# Patient Record
Sex: FEMALE | Race: WHITE | Employment: OTHER | URBAN - METROPOLITAN AREA
[De-identification: names, ages, dates, MRNs, and addresses within clinical notes are randomized per-mention and may not be internally consistent; named-entity substitution may affect disease eponyms.]

---

## 2018-08-21 ENCOUNTER — TRANSFERRED RECORDS (OUTPATIENT)
Dept: HEALTH INFORMATION MANAGEMENT | Facility: CLINIC | Age: 73
End: 2018-08-21

## 2018-09-17 ENCOUNTER — OFFICE VISIT (OUTPATIENT)
Dept: OPHTHALMOLOGY | Facility: CLINIC | Age: 73
End: 2018-09-17
Payer: COMMERCIAL

## 2018-09-17 DIAGNOSIS — Z98.890 HX OF LASIK: ICD-10-CM

## 2018-09-17 DIAGNOSIS — H25.813 COMBINED FORM OF AGE-RELATED CATARACT, BOTH EYES: Primary | ICD-10-CM

## 2018-09-17 PROCEDURE — 92002 INTRM OPH EXAM NEW PATIENT: CPT | Performed by: OPHTHALMOLOGY

## 2018-09-17 ASSESSMENT — VISUAL ACUITY
CORRECTION_TYPE: GLASSES
OD_CC+: -1
OS_CC: 20/30
METHOD: SNELLEN - LINEAR
OD_CC: 20/25

## 2018-09-17 ASSESSMENT — EXTERNAL EXAM - RIGHT EYE: OD_EXAM: PROLAPSED FAT PADS: UPPER, LOWER

## 2018-09-17 ASSESSMENT — REFRACTION_WEARINGRX
OS_AXIS: 021
OD_ADD: +2.50
OS_ADD: +2.50
OD_SPHERE: +2.00
OD_AXIS: 159
OS_SPHERE: +1.00
SPECS_TYPE: PAL
OD_CYLINDER: +0.50
OS_CYLINDER: +0.50

## 2018-09-17 ASSESSMENT — SLIT LAMP EXAM - LIDS
COMMENTS: 1+ DERMATOCHALASIS
COMMENTS: 1+ DERMATOCHALASIS

## 2018-09-17 ASSESSMENT — EXTERNAL EXAM - LEFT EYE: OS_EXAM: PROLAPSED FAT PADS: UPPER, LOWER

## 2018-09-17 NOTE — PROGRESS NOTES
Current Eye Medications:  None.       Subjective:  She is here for a second opinion on her cataracts.  She see's Dr. Lakhani, OD at [facility] and he recommended she have cataract surgery with [facility] two years ago.  She was evaluated for cataracts, was told that her cataracts were not ready for surgery (2 years ago).  She recently saw Dr. Webber again on 8-21-18 and he told her that her eyes were about the same.  She brought a copy of her last exam.    Overall, she feels her vision is not as good as she'd like, in both eyes.    Sister Jelly Loredo and her  are patients of Dr. Ferguson and they recommended she come here.  She declines dilation (I informed her that Dr. Ferguson would not be able to fully evaluate her cataracts without dilation), she will return for dilation when she can bring a .   Status/Post Lasik surgery in 1997 with Dr. Napier.  No family history of glaucoma.       Objective:  See Ophthalmology Exam.       Assessment:  Cataracts do not appear to be visually significant on exam today undilated.      Plan:  Return visit 6 months for a complete exam.  Sign record release form.  (Get LASIK info).  Antonio Ferguson M.D.  [phone]

## 2018-09-17 NOTE — MR AVS SNAPSHOT
After Visit Summary   9/17/2018    Diana Rahman    MRN: [id number]           Patient Information     Date Of Birth          1945        Visit Information        Provider Department      9/17/2018 9:15 AM Antonio Ferguson MD [facility]        Today's Diagnoses     S/P LASIK surgery of both eyes    -  1    Combined form of age-related cataract, both eyes          Care Instructions    Return visit 6 months for a complete exam.  Sign record release form.  Antonio Ferguson M.D.  [phone]            Follow-ups after your visit        Who to contact     If you have questions or need follow up information about today's clinic visit or your schedule please contact [facility] directly at [phone].  Normal or non-critical lab and imaging results will be communicated to you by MyChart, letter or phone within 4 business days after the clinic has received the results. If you do not hear from us within 7 days, please contact the clinic through MyChart or phone. If you have a critical or abnormal lab result, we will notify you by phone as soon as possible.  Submit refill requests through [facility] or call your pharmacy and they will forward the refill request to us. Please allow 3 business days for your refill to be completed.          Additional Information About Your Visit        Care EveryWhere ID     This is your Care EveryWhere ID. This could be used by other organizations to access your [place] medical records  [id number]         Blood Pressure from Last 3 Encounters:   No data found for BP    Weight from Last 3 Encounters:   No data found for Wt              Today, you had the following     No orders found for display       Primary Care Provider Office Phone # Fax #    Bobo Guidry [phone]       [facility] [address]  [facility] [address]        Equal Access to Services     SPENSER HAWTHORNE AH: sondra Stringer  angel paula waxkaleb bernardin hayaajayson raojustin cleaningjayson juan. So [facility] [phone].    ATENCIÓN: Si colin randall, tiene a amezquita disposición servicios gratuitos de asistencia lingüística. Llame al [phone].    We comply with applicable federal civil rights laws and [place] laws. We do not discriminate on the basis of race, color, national origin, age, disability, sex, sexual orientation, or gender identity.            Thank you!     Thank you for choosing [facility]  for your care. Our goal is always to provide you with excellent care. Hearing back from our patients is one way we can continue to improve our services. Please take a few minutes to complete the written survey that you may receive in the mail after your visit with us. Thank you!             Your Updated Medication List - Protect others around you: Learn how to safely use, store and throw away your medicines at www.disposemymeds.org.          This list is accurate as of 9/17/18  9:34 AM.  Always use your most recent med list.                   Brand Name Dispense Instructions for use Diagnosis    UNKNOWN TO PATIENT      Cholesterol medicine

## 2018-09-17 NOTE — LETTER
9/17/2018         RE: Diana Rahman  [address] [facility] [address]        Dear Colleague,    Thank you for referring your patient, Diana Rahman, to the [facility]. Please see a copy of my visit note below.     Current Eye Medications:  None.       Subjective:  She is here for a second opinion on her cataracts.  She see's Dr. Lakhani, OD at [facility] and he recommended she have cataract surgery with [facility] two years ago.  She was evaluated for cataracts, was told that her cataracts were not ready for surgery (2 years ago).  She recently saw Dr. Webber again on 8-21-18 and he told her that her eyes were about the same.  She brought a copy of her last exam.    Overall, she feels her vision is not as good as she'd like, in both eyes.    Sister Jelly Loredo and her  are patients of Dr. Ferguson and they recommended she come here.  She declines dilation (I informed her that Dr. Ferguson would not be able to fully evaluate her cataracts without dilation), she will return for dilation when she can bring a .   Status/Post Lasik surgery in 1997 with Dr. Napier.  No family history of glaucoma.       Objective:  See Ophthalmology Exam.       Assessment:  Cataracts do not appear to be visually significant on exam today undilated.      Plan:  Return visit 6 months for a complete exam.  Sign record release form.  (Get LASIK info).  Antonio Ferguson M.D.  [phone]         Again, thank you for allowing me to participate in the care of your patient.        Sincerely,        Antonio Ferguson MD

## 2018-09-17 NOTE — PATIENT INSTRUCTIONS
Return visit 6 months for a complete exam.  Sign record release form.  Antonio Ferguson M.D.  [phone]

## 2021-03-05 ENCOUNTER — IMMUNIZATION (OUTPATIENT)
Dept: NURSING | Facility: CLINIC | Age: 76
End: 2021-03-05
Payer: COMMERCIAL

## 2021-03-05 PROCEDURE — 91300 PR COVID VAC PFIZER DIL RECON 30 MCG/0.3 ML IM: CPT

## 2021-03-05 PROCEDURE — 0001A PR COVID VAC PFIZER DIL RECON 30 MCG/0.3 ML IM: CPT

## 2021-03-26 ENCOUNTER — IMMUNIZATION (OUTPATIENT)
Dept: NURSING | Facility: CLINIC | Age: 76
End: 2021-03-26
Attending: FAMILY MEDICINE
Payer: COMMERCIAL

## 2021-03-26 PROCEDURE — 0002A PR COVID VAC PFIZER DIL RECON 30 MCG/0.3 ML IM: CPT

## 2021-03-26 PROCEDURE — 91300 PR COVID VAC PFIZER DIL RECON 30 MCG/0.3 ML IM: CPT

## 2022-12-06 ENCOUNTER — PREPPED CHART (OUTPATIENT)
Dept: URBAN - METROPOLITAN AREA CLINIC 53 | Facility: CLINIC | Age: 77
End: 2022-12-06

## 2022-12-07 ENCOUNTER — NEW PATIENT (OUTPATIENT)
Dept: URBAN - METROPOLITAN AREA CLINIC 53 | Facility: CLINIC | Age: 77
End: 2022-12-07

## 2022-12-07 PROCEDURE — 92004 COMPRE OPH EXAM NEW PT 1/>: CPT

## 2022-12-07 PROCEDURE — 92134 CPTRZ OPH DX IMG PST SGM RTA: CPT

## 2022-12-07 PROCEDURE — 92015 DETERMINE REFRACTIVE STATE: CPT

## 2022-12-07 ASSESSMENT — TONOMETRY
OD_IOP_MMHG: 12
OS_IOP_MMHG: 12

## 2022-12-07 ASSESSMENT — VISUAL ACUITY
OU_CC: J1+ @ 18 IN
OD_GLARE: 20/40
OU_SC: J1 @ 18 IN
OU_CC: 20/30
OS_CC: 20/30
OS_GLARE: 20/30
OD_GLARE: 20/30
OS_SC: 20/60-2
OD_SC: 20/50
OU_SC: 20/40
OD_CC: 20/40
OS_PH: 20/25
OS_GLARE: 20/25

## 2022-12-07 ASSESSMENT — KERATOMETRY
OD_AXISANGLE2_DEGREES: 156
OD_K1POWER_DIOPTERS: 39.50
OS_K2POWER_DIOPTERS: 41.25
OS_AXISANGLE2_DEGREES: 27
OS_AXISANGLE_DEGREES: 117
OD_AXISANGLE_DEGREES: 66
OD_K2POWER_DIOPTERS: 40.50
OS_K1POWER_DIOPTERS: 40.50

## 2023-01-04 ENCOUNTER — PRE-OP/H&P (OUTPATIENT)
Dept: URBAN - METROPOLITAN AREA CLINIC 53 | Facility: CLINIC | Age: 78
End: 2023-01-04

## 2023-01-04 DIAGNOSIS — H25.13: ICD-10-CM

## 2023-01-04 PROCEDURE — 92025-2 CORNEAL TOPOGRAPHY, PT

## 2023-01-04 PROCEDURE — PREOP PRE OP VISIT

## 2023-01-04 PROCEDURE — 92136 OPHTHALMIC BIOMETRY: CPT

## 2023-01-04 ASSESSMENT — KERATOMETRY
OD_AXISANGLE_DEGREES: 66
OS_K1POWER_DIOPTERS: 40.50
OD_K1POWER_DIOPTERS: 39.50
OS_AXISANGLE_DEGREES: 117
OS_K2POWER_DIOPTERS: 41.25
OD_K2POWER_DIOPTERS: 40.50
OD_AXISANGLE2_DEGREES: 156
OS_AXISANGLE2_DEGREES: 27

## 2023-01-04 ASSESSMENT — VISUAL ACUITY
OU_CC: 20/30-1
OD_PH: 20/30
OS_PH: 20/30
OS_CC: 20/30-1
OD_CC: 20/40

## 2023-01-04 ASSESSMENT — TONOMETRY
OD_IOP_MMHG: 12
OS_IOP_MMHG: 12

## 2023-01-12 ENCOUNTER — SURGERY/PROCEDURE (OUTPATIENT)
Dept: URBAN - METROPOLITAN AREA SURGERY 16 | Facility: SURGERY | Age: 78
End: 2023-01-12

## 2023-01-12 ENCOUNTER — POST-OP (OUTPATIENT)
Dept: URBAN - METROPOLITAN AREA CLINIC 48 | Facility: LOCATION | Age: 78
End: 2023-01-12

## 2023-01-12 DIAGNOSIS — H25.11: ICD-10-CM

## 2023-01-12 DIAGNOSIS — Z96.1: ICD-10-CM

## 2023-01-12 DIAGNOSIS — Z98.41: ICD-10-CM

## 2023-01-12 PROCEDURE — 66984 XCAPSL CTRC RMVL W/O ECP: CPT

## 2023-01-12 ASSESSMENT — VISUAL ACUITY: OD_SC: 20/400

## 2023-01-12 ASSESSMENT — KERATOMETRY
OS_K2POWER_DIOPTERS: 41.25
OD_K2POWER_DIOPTERS: 40.50
OD_AXISANGLE2_DEGREES: 156
OS_AXISANGLE2_DEGREES: 27
OS_K1POWER_DIOPTERS: 40.50
OD_K1POWER_DIOPTERS: 39.50
OS_AXISANGLE_DEGREES: 117
OS_K1POWER_DIOPTERS: 40.50
OD_K1POWER_DIOPTERS: 39.50
OD_K2POWER_DIOPTERS: 40.50
OD_AXISANGLE_DEGREES: 66
OS_AXISANGLE_DEGREES: 117
OD_AXISANGLE2_DEGREES: 156
OS_AXISANGLE2_DEGREES: 27
OD_AXISANGLE_DEGREES: 66
OS_K2POWER_DIOPTERS: 41.25

## 2023-01-12 ASSESSMENT — TONOMETRY: OD_IOP_MMHG: 13

## 2023-01-18 ENCOUNTER — POST OP/EVAL OF SECOND EYE (OUTPATIENT)
Dept: URBAN - METROPOLITAN AREA CLINIC 48 | Facility: LOCATION | Age: 78
End: 2023-01-18

## 2023-01-18 DIAGNOSIS — Z98.41: ICD-10-CM

## 2023-01-18 DIAGNOSIS — Z96.1: ICD-10-CM

## 2023-01-18 DIAGNOSIS — H25.12: ICD-10-CM

## 2023-01-18 PROCEDURE — 99213 OFFICE O/P EST LOW 20 MIN: CPT

## 2023-01-18 PROCEDURE — 92136 - 2N OPHTHALMIC BIOMETRY BY PARTIAL COHERENCE INTERFEROMETRY WITH INTRAOCULAR LENS POWER CALCULATION

## 2023-01-18 ASSESSMENT — TONOMETRY
OS_IOP_MMHG: 14
OD_IOP_MMHG: 14

## 2023-01-18 ASSESSMENT — KERATOMETRY
OD_K2POWER_DIOPTERS: 40.50
OS_AXISANGLE2_DEGREES: 27
OS_AXISANGLE_DEGREES: 117
OD_AXISANGLE_DEGREES: 66
OD_K1POWER_DIOPTERS: 39.50
OS_K1POWER_DIOPTERS: 40.50
OD_AXISANGLE2_DEGREES: 156
OS_K2POWER_DIOPTERS: 41.25

## 2023-01-18 ASSESSMENT — VISUAL ACUITY
OS_SC: 20/25
OD_SC: 20/25

## 2023-01-26 ENCOUNTER — POST-OP (OUTPATIENT)
Dept: URBAN - METROPOLITAN AREA CLINIC 48 | Facility: LOCATION | Age: 78
End: 2023-01-26

## 2023-01-26 ENCOUNTER — SURGERY/PROCEDURE (OUTPATIENT)
Dept: URBAN - METROPOLITAN AREA SURGERY 16 | Facility: SURGERY | Age: 78
End: 2023-01-26

## 2023-01-26 DIAGNOSIS — H25.12: ICD-10-CM

## 2023-01-26 DIAGNOSIS — Z98.42: ICD-10-CM

## 2023-01-26 DIAGNOSIS — Z96.1: ICD-10-CM

## 2023-01-26 PROCEDURE — 66984 XCAPSL CTRC RMVL W/O ECP: CPT

## 2023-01-26 ASSESSMENT — KERATOMETRY
OS_K1POWER_DIOPTERS: 40.50
OD_K1POWER_DIOPTERS: 39.50
OD_K2POWER_DIOPTERS: 40.50
OD_K1POWER_DIOPTERS: 39.50
OS_AXISANGLE2_DEGREES: 27
OS_AXISANGLE_DEGREES: 117
OD_AXISANGLE2_DEGREES: 156
OS_AXISANGLE_DEGREES: 117
OS_K1POWER_DIOPTERS: 40.50
OD_AXISANGLE2_DEGREES: 156
OD_K2POWER_DIOPTERS: 40.50
OS_K2POWER_DIOPTERS: 41.25
OD_AXISANGLE_DEGREES: 66
OD_AXISANGLE_DEGREES: 66
OS_K2POWER_DIOPTERS: 41.25
OS_AXISANGLE2_DEGREES: 27

## 2023-01-26 ASSESSMENT — TONOMETRY: OS_IOP_MMHG: 04

## 2023-01-26 ASSESSMENT — VISUAL ACUITY: OS_SC: 20/60

## 2023-09-27 ENCOUNTER — ESTABLISHED PATIENT (OUTPATIENT)
Dept: URBAN - METROPOLITAN AREA CLINIC 53 | Facility: CLINIC | Age: 78
End: 2023-09-27

## 2023-09-27 DIAGNOSIS — H35.372: ICD-10-CM

## 2023-09-27 DIAGNOSIS — H26.493: ICD-10-CM

## 2023-09-27 PROCEDURE — 92134 CPTRZ OPH DX IMG PST SGM RTA: CPT

## 2023-09-27 PROCEDURE — 92014 COMPRE OPH EXAM EST PT 1/>: CPT

## 2023-09-27 ASSESSMENT — TONOMETRY
OD_IOP_MMHG: 16
OS_IOP_MMHG: 14

## 2023-09-27 ASSESSMENT — KERATOMETRY
OD_AXISANGLE_DEGREES: 66
OD_K1POWER_DIOPTERS: 39.50
OS_K1POWER_DIOPTERS: 40.50
OS_K2POWER_DIOPTERS: 41.25
OS_AXISANGLE2_DEGREES: 27
OD_AXISANGLE2_DEGREES: 156
OS_AXISANGLE_DEGREES: 117
OD_K2POWER_DIOPTERS: 40.50

## 2023-09-27 ASSESSMENT — VISUAL ACUITY
OS_SC: 20/30-1
OD_SC: 20/30
OD_PH: 20/20

## 2024-12-20 ENCOUNTER — COMPREHENSIVE EXAM (OUTPATIENT)
Age: 79
End: 2024-12-20

## 2024-12-20 DIAGNOSIS — H16.223: ICD-10-CM

## 2024-12-20 DIAGNOSIS — H35.372: ICD-10-CM

## 2024-12-20 DIAGNOSIS — H26.493: ICD-10-CM

## 2024-12-20 DIAGNOSIS — H43.811: ICD-10-CM

## 2024-12-20 PROCEDURE — 99214 OFFICE O/P EST MOD 30 MIN: CPT

## 2024-12-20 PROCEDURE — 92134 CPTRZ OPH DX IMG PST SGM RTA: CPT

## 2025-02-12 ENCOUNTER — CONSULTATION/EVALUATION (OUTPATIENT)
Age: 80
End: 2025-02-12

## 2025-02-12 DIAGNOSIS — H16.223: ICD-10-CM

## 2025-02-12 DIAGNOSIS — H35.372: ICD-10-CM

## 2025-02-12 DIAGNOSIS — H43.811: ICD-10-CM

## 2025-02-12 DIAGNOSIS — H26.493: ICD-10-CM

## 2025-02-12 PROCEDURE — 99203 OFFICE O/P NEW LOW 30 MIN: CPT | Mod: 57

## 2025-02-12 PROCEDURE — 66821 AFTER CATARACT LASER SURGERY: CPT | Mod: 54,LT

## 2025-02-12 RX ORDER — PREDNISOLONE ACETATE 10 MG/ML: 1 SUSPENSION/ DROPS OPHTHALMIC TWICE A DAY

## 2025-02-26 ENCOUNTER — CLINIC PROCEDURE ONLY (OUTPATIENT)
Age: 80
End: 2025-02-26

## 2025-02-26 DIAGNOSIS — H26.491: ICD-10-CM

## 2025-02-26 PROCEDURE — 66821 AFTER CATARACT LASER SURGERY: CPT | Mod: 54,RT,79,RT

## 2025-03-26 ENCOUNTER — POST-OP (OUTPATIENT)
Age: 80
End: 2025-03-26

## 2025-03-26 DIAGNOSIS — H52.4: ICD-10-CM

## 2025-03-26 DIAGNOSIS — Z98.890: ICD-10-CM

## 2025-03-26 PROCEDURE — 99024 POSTOP FOLLOW-UP VISIT: CPT

## 2025-03-26 PROCEDURE — 92015 DETERMINE REFRACTIVE STATE: CPT
